# Patient Record
Sex: FEMALE | Race: ASIAN | NOT HISPANIC OR LATINO | Employment: FULL TIME
[De-identification: names, ages, dates, MRNs, and addresses within clinical notes are randomized per-mention and may not be internally consistent; named-entity substitution may affect disease eponyms.]

---

## 2017-08-12 ENCOUNTER — HEALTH MAINTENANCE LETTER (OUTPATIENT)
Age: 42
End: 2017-08-12

## 2023-10-10 ENCOUNTER — OFFICE VISIT (OUTPATIENT)
Dept: CARDIOLOGY | Facility: CLINIC | Age: 48
End: 2023-10-10
Payer: COMMERCIAL

## 2023-10-10 ENCOUNTER — TELEPHONE (OUTPATIENT)
Dept: CARDIOLOGY | Facility: CLINIC | Age: 48
End: 2023-10-10

## 2023-10-10 VITALS
OXYGEN SATURATION: 98 % | HEIGHT: 66 IN | BODY MASS INDEX: 24.75 KG/M2 | HEART RATE: 76 BPM | SYSTOLIC BLOOD PRESSURE: 108 MMHG | WEIGHT: 154 LBS | DIASTOLIC BLOOD PRESSURE: 68 MMHG

## 2023-10-10 DIAGNOSIS — R94.31 NONSPECIFIC ABNORMAL ELECTROCARDIOGRAM (ECG) (EKG): Primary | ICD-10-CM

## 2023-10-10 DIAGNOSIS — R07.89 ATYPICAL CHEST PAIN: ICD-10-CM

## 2023-10-10 PROCEDURE — 93000 ELECTROCARDIOGRAM COMPLETE: CPT | Performed by: INTERNAL MEDICINE

## 2023-10-10 PROCEDURE — 99203 OFFICE O/P NEW LOW 30 MIN: CPT | Performed by: INTERNAL MEDICINE

## 2023-10-10 NOTE — PROGRESS NOTES
"CARDIOLOGY CLINIC CONSULT    REASON FOR CONSULT: chest pain    PRIMARY CARE PHYSICIAN:  Azra Cortez    HISTORY OF PRESENT ILLNESS:  Sandra Cannon is a very nice 48 year old F patient here today to discuss chest pain and palpitations.    She was referred by her primary care doctor for history of chest pain in the left chest area and occasional palpitations over the past several years.  Symptoms are not exertional or associated with dyspnea.    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  She has a very stressful dynamic with her mom especially. She and her parents are from [place] and parents don't speak English.     When she opens her work email she feels her heart rate go up and she will feel left sided chest pain. This has been happening almost daily for 2 yrs.. She tends to get chest pain with emotional stress. She also gets left shoulder and arm pain and it is painful to hold her phone with her left hand. She does not like to wear a bra because it makes her feel short of breath.  It is very uncomfortable to sit at her computer because she gets left-sided chest pain and left arm pain when she is working on the keyboard.      She never exercises because she does not have time.  She does feel like she may be depressed.  S she has avoided formal screening or treatment for depression because she fears they will use against her if her employer find out she has depression or if she ever gets  she fears it will be an issue with custody.    She had an EKG done at some type of health screening event and was told that she must have \"had a heart attack\".  This made her feel very anxious and is part of the reason she is here today.      PAST MEDICAL HISTORY:  Past Medical History:   Diagnosis Date    Dyspepsia and other specified disorders of function of stomach 2006    pos h.pylori tx'ed.       MEDICATIONS:  Current Outpatient " Medications   Medication    PRENATAL VITAMIN TABS   OR    PRENATAL VITAMIN TABS   OR     No current facility-administered medications for this visit.       ALLERGIES:  No Known Allergies    SOCIAL HISTORY:  I have reviewed this patient's social history and updated it with pertinent information if needed. Sandra Cannon  reports that she has never smoked. She has never used smokeless tobacco. She reports that she does not drink alcohol.     FAMILY HISTORY:  Dads sister  at age 46 from heart disease. May have been rheumatic heart disease.     REVIEW OF SYSTEMS:  Skin:  Negative     Eyes:  Positive for visual blurring  ENT:  Negative    Respiratory:  Negative    Cardiovascular:    exercise intolerance;Positive for;palpitations;chest pain;fatigue  Gastroenterology: Negative    Genitourinary:  Negative    Musculoskeletal:  Positive for back pain;neck pain;joint stiffness  Neurologic:  Positive for numbness or tingling of hands  Psychiatric:  Positive for excessive stress;anxiety  Heme/Lymph/Imm:  Negative    Endocrine:  Negative      PHYSICAL EXAM:      BP: 108/68 Pulse: 76     SpO2: 98 %      Vital Signs with Ranges  Pulse:  [76] 76  BP: (108)/(68) 108/68  SpO2:  [98 %] 98 %  154 lbs 0 oz    Constitutional: awake, alert, no distress  Eyes: sclera nonicteric  ENT: trachea midline  Respiratory: clear bilaterally  Cardiovascular: regular rate and rhythm no murmur  GI: nondistended, nontender, bowel sounds present  Skin: dry, no rash no edema  Musculoskeletal: grossly normal muscle bulk and tone  Neuropsychiatric: Anxious affect      DATA:   LAST CHOLESTEROL:  At [facility]  LIPIDS   BhfthlEcepvgqp63/03/2022  Component 2022       Cholesterol 140   Triglyceride 83   HDL Cholesterol 59   LDL, Calculated 64   Non HDL Chol, Calculated 81   Cholesterol/HDL Ratio 2.4         EKG this appears to be a sinus rhythm with a short  minutes seconds normal QRS interval and normal heart rate      ASSESSMENT:  Atypical chest  pain. Also has Musculoskeletal chest and shoulder pain.   Abnormal EKG. Stress EKG only would not be diagnostic due to baseline ST depression  Probable depression    Her chest pain is atypical, but she is sedentary.  She is concerned that cost of the CT angiogram would be too high and would prefer to have a test which measures her exercise functional capacity study.  A stress EKG would be expected to be nondiagnostic due to her baseline EKG abnormalities but a stress echocardiogram may be useful to screen for ischemia as well as measure functional capacity.    RECOMMENDATIONS:  Exercise stress echo  30 min moderate exercise daily  Heart-healthy diet  Strongly encouraged her to seek evaluation and treatment for depression as well as attend routine primary care visits for health screening.  If stress echocardiogram is unremarkable then cardiology follow up as needed.     Brandi Knutson MD [facility]  Text Page

## 2023-10-10 NOTE — TELEPHONE ENCOUNTER
[facility] Call Center    Phone Message    May a detailed message be left on voicemail: yes     Reason for Call: Other: Please call patient with CPT codes for stress echocardiogram and CT angiogram so she can get prior auth from her insurance     Action Taken: Other: cardio    Travel Screening: Not Applicable

## 2023-10-10 NOTE — LETTER
"10/10/2023    Azra Cortez MD  [address] [place] [address]    RE: Sandra Cannon       Dear Colleague,     I had the pleasure of seeing Sandra Cannon in the [facility].  CARDIOLOGY CLINIC CONSULT    REASON FOR CONSULT: chest pain    PRIMARY CARE PHYSICIAN:  Azra Cortez    HISTORY OF PRESENT ILLNESS:  Sandra Cannon is a very nice 48 year old F patient here today to discuss chest pain and palpitations.    She was referred by her primary care doctor for history of chest pain in the left chest area and occasional palpitations over the past several years.  Symptoms are not exertional or associated with dyspnea.    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  She has a very stressful dynamic with her mom especially. She and her parents are from [place] and parents don't speak English.     When she opens her work email she feels her heart rate go up and she will feel left sided chest pain. This has been happening almost daily for 2 yrs.. She tends to get chest pain with emotional stress. She also gets left shoulder and arm pain and it is painful to hold her phone with her left hand. She does not like to wear a bra because it makes her feel short of breath.  It is very uncomfortable to sit at her computer because she gets left-sided chest pain and left arm pain when she is working on the keyboard.      She never exercises because she does not have time.  She does feel like she may be depressed.  S she has avoided formal screening or treatment for depression because she fears they will use against her if her employer find out she has depression or if she ever gets  she fears it will be an issue with custody.    She had an EKG done at some type of health screening event and was told that she must have \"had a heart attack\".  This made her feel very anxious and is part of the reason she is here " today.      PAST MEDICAL HISTORY:  Past Medical History:   Diagnosis Date    Dyspepsia and other specified disorders of function of stomach 2006    pos h.pylori tx'ed.       MEDICATIONS:  Current Outpatient Medications   Medication    PRENATAL VITAMIN TABS   OR    PRENATAL VITAMIN TABS   OR     No current facility-administered medications for this visit.       ALLERGIES:  No Known Allergies    SOCIAL HISTORY:  I have reviewed this patient's social history and updated it with pertinent information if needed. Sandra Cannon  reports that she has never smoked. She has never used smokeless tobacco. She reports that she does not drink alcohol.     FAMILY HISTORY:  Dads sister  at age 46 from heart disease. May have been rheumatic heart disease.     REVIEW OF SYSTEMS:  Skin:  Negative     Eyes:  Positive for visual blurring  ENT:  Negative    Respiratory:  Negative    Cardiovascular:    exercise intolerance;Positive for;palpitations;chest pain;fatigue  Gastroenterology: Negative    Genitourinary:  Negative    Musculoskeletal:  Positive for back pain;neck pain;joint stiffness  Neurologic:  Positive for numbness or tingling of hands  Psychiatric:  Positive for excessive stress;anxiety  Heme/Lymph/Imm:  Negative    Endocrine:  Negative      PHYSICAL EXAM:      BP: 108/68 Pulse: 76     SpO2: 98 %      Vital Signs with Ranges  Pulse:  [76] 76  BP: (108)/(68) 108/68  SpO2:  [98 %] 98 %  154 lbs 0 oz    Constitutional: awake, alert, no distress  Eyes: sclera nonicteric  ENT: trachea midline  Respiratory: clear bilaterally  Cardiovascular: regular rate and rhythm no murmur  GI: nondistended, nontender, bowel sounds present  Skin: dry, no rash no edema  Musculoskeletal: grossly normal muscle bulk and tone  Neuropsychiatric: Anxious affect      DATA:   LAST CHOLESTEROL:  At [facility]  LIPIDS   [id number]/03/2022  Component 2022       Cholesterol 140   Triglyceride 83   HDL Cholesterol 59   LDL, Calculated 64   Non HDL  Chol, Calculated 81   Cholesterol/HDL Ratio 2.4         EKG this appears to be a sinus rhythm with a short  minutes seconds normal QRS interval and normal heart rate      ASSESSMENT:  Atypical chest pain. Also has Musculoskeletal chest and shoulder pain.   Abnormal EKG. Stress EKG only would not be diagnostic due to baseline ST depression  Probable depression    Her chest pain is atypical, but she is sedentary.  She is concerned that cost of the CT angiogram would be too high and would prefer to have a test which measures her exercise functional capacity study.  A stress EKG would be expected to be nondiagnostic due to her baseline EKG abnormalities but a stress echocardiogram may be useful to screen for ischemia as well as measure functional capacity.    RECOMMENDATIONS:  Exercise stress echo  30 min moderate exercise daily  Heart-healthy diet  Strongly encouraged her to seek evaluation and treatment for depression as well as attend routine primary care visits for health screening.  If stress echocardiogram is unremarkable then cardiology follow up as needed.     Brandi Knutson MD [facility] Heart  Text Page         Thank you for allowing me to participate in the care of your patient.      Sincerely,     Brandi Knutson MD     [facility]  cc:   No referring provider defined for this encounter.

## 2023-10-11 NOTE — TELEPHONE ENCOUNTER
No order in place for CT angiogram.  Order in place for exercise stress echo:            Called and spoke with patient.  She explained that she has a high deductible ($3000) and will end up paying the majority, if not all, of the cost of any testing that will be done.  This calendar year, she has only seen her PCP for her annual physical, so she it not close to meeting her deductible.  Diagnosis codes above provided.  Her insurance is stating that if she has her testing done at [facility], the cost will be about 50% less than if she has her testing done at [facility].  She is inquiring how the order can reflect having her test done through [facility].  In googling that organization, it only showed locations in [place], [place].  Advised pt to call back her insurance and clarify where in the [facility] area they are located.  We also discussed changing her insurance for 2024 so she has better coverage.  She is going to look into this.  We discussed deferring the testing until 2024, so if she has to pay out of pocket for the test, it will apply to the 2024 calendar year, instead of the 2023 calendar year which has less than 3 months remaining.  Unclear of the urgency around this, and explained that to Sandra.  Communicated to Sandra that I'd check with Dr Knutson about this.  Routing to Dr Knutson for direction on urgency of cardiac testing.      ADDENDUM  Called Sandra back to explain that her cardiac testing has to be done within the [facility] system.  In order for the prior auth to be initiated, the test needs to be scheduled.  Called Sandra back and communicated the above information.  She will call scheduling to set up the test date.        LOV with Dr Knutson on 10/10/23  Her chest pain is atypical, but she is sedentary.  She is concerned that cost of the CT angiogram would be too high and would prefer to have a test which measures her exercise functional capacity study.  A stress EKG would be expected to be  nondiagnostic due to her baseline EKG abnormalities but a stress echocardiogram may be useful to screen for ischemia as well as measure functional capacity.     RECOMMENDATIONS:  Exercise stress echo  30 min moderate exercise daily  Heart-healthy diet  Strongly encouraged her to seek evaluation and treatment for depression as well as attend routine primary care visits for health screening.  If stress echocardiogram is unremarkable then cardiology follow up as needed.

## 2023-10-12 NOTE — TELEPHONE ENCOUNTER
Called and left detailed message for Sandra with Dr Knutson's recommendations below.  Provided Team 1 RN number to call back with questions or concerns.        October 12, 2023  Brandi Knutson MD   to Me   MT    10/12/23 12:14 PM   She may defer due to cost if needed as long as symptoms are stable and not severe or worsening.  Brandi Knutson MD on 10/12/2023 at 12:14 PM

## 2023-10-26 NOTE — TELEPHONE ENCOUNTER
Dr Knutson is aware that she was potentially going to defer.  See 10/12/23 Note within this TE.         10/26/23 Staff Message  Stress echo  Received: Today  Dina Palomino [facility] Team 1  Good Morning,  This is a pt of Dr. Knutson, pt had to cancel her stress echo due to insurance.    I just wanted to pass it along as pt supports her parents and kids and with the cost of her insurance she is going to wait until January and get a new health plan and r/s this. Just wanted to let you know.    Thank you!    Dina

## 2023-11-19 ENCOUNTER — HOSPITAL ENCOUNTER (EMERGENCY)
Facility: CLINIC | Age: 48
Discharge: HOME | End: 2023-11-19
Attending: EMERGENCY MEDICINE | Admitting: EMERGENCY MEDICINE
Payer: COMMERCIAL

## 2023-11-19 ENCOUNTER — APPOINTMENT (OUTPATIENT)
Dept: GENERAL RADIOLOGY | Facility: CLINIC | Age: 48
End: 2023-11-19
Attending: EMERGENCY MEDICINE
Payer: COMMERCIAL

## 2023-11-19 VITALS
DIASTOLIC BLOOD PRESSURE: 96 MMHG | OXYGEN SATURATION: 95 % | RESPIRATION RATE: 16 BRPM | HEART RATE: 68 BPM | SYSTOLIC BLOOD PRESSURE: 133 MMHG

## 2023-11-19 DIAGNOSIS — S93.402A SPRAIN OF LEFT ANKLE, UNSPECIFIED LIGAMENT, INITIAL ENCOUNTER: ICD-10-CM

## 2023-11-19 PROCEDURE — 29515 APPLICATION SHORT LEG SPLINT: CPT

## 2023-11-19 PROCEDURE — 99284 EMERGENCY DEPT VISIT MOD MDM: CPT

## 2023-11-19 PROCEDURE — 73610 X-RAY EXAM OF ANKLE: CPT | Mod: LT

## 2023-11-19 RX ORDER — HYDROMORPHONE HYDROCHLORIDE 1 MG/ML
0.5 INJECTION, SOLUTION INTRAMUSCULAR; INTRAVENOUS; SUBCUTANEOUS EVERY 30 MIN PRN
Status: DISCONTINUED | OUTPATIENT
Start: 2023-11-19 | End: 2023-11-19

## 2023-11-19 RX ORDER — OXYCODONE AND ACETAMINOPHEN 5; 325 MG/1; MG/1
2 TABLET ORAL ONCE
Status: DISCONTINUED | OUTPATIENT
Start: 2023-11-19 | End: 2023-11-19 | Stop reason: HOSPADM

## 2023-11-19 RX ORDER — IBUPROFEN 600 MG/1
600 TABLET, FILM COATED ORAL ONCE
Status: DISCONTINUED | OUTPATIENT
Start: 2023-11-19 | End: 2023-11-19 | Stop reason: HOSPADM

## 2023-11-19 RX ORDER — ONDANSETRON 2 MG/ML
4 INJECTION INTRAMUSCULAR; INTRAVENOUS EVERY 30 MIN PRN
Status: DISCONTINUED | OUTPATIENT
Start: 2023-11-19 | End: 2023-11-19

## 2023-11-19 ASSESSMENT — ACTIVITIES OF DAILY LIVING (ADL): ADLS_ACUITY_SCORE: 35

## 2023-11-19 NOTE — ED PROVIDER NOTES
History     Chief Complaint:  Trauma (Fell down stairs, left deformed ankle )       MIGUEL Cannon is a 48 year old female who presents to the ER for evaluation of left ankle pain and swelling after fall down a few steps.  Patient reports she was walking down the stairs when she missed a step causing her to fall down the remainder 2 stairs with her left ankle inverted.  Since then she has had pain and swelling about the ankle.  No numbness in the foot.  Has not had much of anything to eat or drink today.  Paramedics placed a VERONICA splint and provided her with IV fentanyl.  Currently she has some lingering discomfort but feels much improved.    Medications:    PRENATAL VITAMIN TABS   OR  PRENATAL VITAMIN TABS   OR        Past Medical History:    Past Medical History:   Diagnosis Date    Dyspepsia and other specified disorders of function of stomach 2006       Past Surgical History:    Past Surgical History:   Procedure Laterality Date    COMMENTS  6004    lasix surgery    Z NONSPECIFIC PROCEDURE  2004    abotion        Physical Exam   Patient Vitals for the past 24 hrs:   BP Pulse Resp SpO2   11/19/23 1237 -- -- 16 --   11/19/23 1234 -- -- -- 95 %   11/19/23 1232 (!) 133/96 68 -- --        Physical Exam  VS: Reviewed per above  HENT: Mucous membranes moist  EYES: sclera anicteric  CV: Rate as noted,  regular rhythm.   RESP: Effort normal. Breath sounds are normal bilaterally.  NEURO: Alert, moving all extremities, able to wiggle the left toes.  Sensation intact to light touch in the left foot.  MSK: No deformity of the extremities, left lateral ankle soft tissue swelling and tenderness.  No true foot tenderness.  Intact left DP pulse.  No tenderness about the proximal tib-fib or knee.  SKIN: Warm and dry    Emergency Department Course     Imaging:  XR Ankle Left G/E 3 Views   Final Result   IMPRESSION: Anterior ankle soft tissue swelling. No fracture or joint malalignment. The ankle mortise is symmetric.            Emergency Department Course & Assessments:       Disposition:  The patient was discharged to home.     Impression & Plan      Medical Decision Making:  Patient presents to the ER for evaluation of left ankle pain and swelling after fall down 2 stairs.  Vital signs reassuring.  No evidence of neurovascular compromise in the left lower extremity distally.  X-ray does not show fracture dislocation of the ankle.  Suspect ankle sprain.  Patient was placed in Aircast and provided with crutches with instruction for weightbearing as tolerated.  Discussed symptom control with ibuprofen, Tylenol, icing, elevation.  If not improving in a few weeks time, recommended orthopedic follow-up for reassessment.  Return precautions discussed.      Diagnosis:    ICD-10-CM    1. Sprain of left ankle, unspecified ligament, initial encounter  S93.402A Crutches Order     Ankle/Foot Bracing Supplies DME Air Ankle Stirrup Brace; Left           Discharge Medications:  Discharge Medication List as of 11/19/2023  1:21 PM              Charles Connor MD  11/19/23 3705

## 2023-11-19 NOTE — ED NOTES
Bed: FT03  Expected date:   Expected time:   Means of arrival:   Comments:  425   48 F fall/ankle injury  1212

## 2024-10-03 ENCOUNTER — TELEPHONE (OUTPATIENT)
Dept: CARDIOLOGY | Facility: CLINIC | Age: 49
End: 2024-10-03
Payer: COMMERCIAL

## 2024-10-03 NOTE — TELEPHONE ENCOUNTER
Called patient to let her know orders are placed for exercise stress echo as previously recommended. Given number for scheduling. Encouraged to call back with any further questions/concerns.

## 2024-10-03 NOTE — TELEPHONE ENCOUNTER
[facility] Call Center    Phone Message    May a detailed message be left on voicemail: yes     Reason for Call: Order(s): Other:   Reason for requested: Exercise Stress Echocardiogram  Date needed: Update order   Provider name: Zenia    Patient would like to schedule testing now. Order expires before patient can get in.     Action Taken: Other: cardiology     Travel Screening: Not Applicable    Thank you!  [facility]       Date of Service:

## 2024-10-10 ENCOUNTER — HOSPITAL ENCOUNTER (OUTPATIENT)
Dept: CARDIOLOGY | Facility: CLINIC | Age: 49
Discharge: HOME | End: 2024-10-10
Attending: INTERNAL MEDICINE | Admitting: INTERNAL MEDICINE
Payer: COMMERCIAL

## 2024-10-10 DIAGNOSIS — R94.31 NONSPECIFIC ABNORMAL ELECTROCARDIOGRAM (ECG) (EKG): ICD-10-CM

## 2024-10-10 DIAGNOSIS — R07.89 ATYPICAL CHEST PAIN: ICD-10-CM

## 2024-10-10 PROCEDURE — 93018 CV STRESS TEST I&R ONLY: CPT | Performed by: INTERNAL MEDICINE

## 2024-10-10 PROCEDURE — 93321 DOPPLER ECHO F-UP/LMTD STD: CPT | Mod: 26 | Performed by: INTERNAL MEDICINE

## 2024-10-10 PROCEDURE — 93325 DOPPLER ECHO COLOR FLOW MAPG: CPT | Mod: 26 | Performed by: INTERNAL MEDICINE

## 2024-10-10 PROCEDURE — 93350 STRESS TTE ONLY: CPT | Mod: 26 | Performed by: INTERNAL MEDICINE

## 2024-10-10 PROCEDURE — 255N000002 HC RX 255 OP 636: Performed by: INTERNAL MEDICINE

## 2024-10-10 PROCEDURE — 93017 CV STRESS TEST TRACING ONLY: CPT

## 2024-10-10 PROCEDURE — 93016 CV STRESS TEST SUPVJ ONLY: CPT | Performed by: INTERNAL MEDICINE

## 2024-10-10 PROCEDURE — 999N000208 ECHO STRESS ECHOCARDIOGRAM

## 2024-10-10 RX ADMIN — HUMAN ALBUMIN MICROSPHERES AND PERFLUTREN 9 ML: 10; .22 INJECTION, SOLUTION INTRAVENOUS at 11:46
